# Patient Record
Sex: MALE | Race: WHITE | NOT HISPANIC OR LATINO | URBAN - METROPOLITAN AREA
[De-identification: names, ages, dates, MRNs, and addresses within clinical notes are randomized per-mention and may not be internally consistent; named-entity substitution may affect disease eponyms.]

---
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
## 2024-09-04 ENCOUNTER — APPOINTMENT (OUTPATIENT)
Dept: OTOLARYNGOLOGY | Facility: CLINIC | Age: 19
End: 2024-09-04
Payer: MEDICAID

## 2024-09-04 DIAGNOSIS — H93.8X3 OTHER SPECIFIED DISORDERS OF EAR, BILATERAL: ICD-10-CM

## 2024-09-04 DIAGNOSIS — R09.81 NASAL CONGESTION: ICD-10-CM

## 2024-09-04 DIAGNOSIS — H61.23 IMPACTED CERUMEN, BILATERAL: ICD-10-CM

## 2024-09-04 PROCEDURE — 69210 REMOVE IMPACTED EAR WAX UNI: CPT

## 2024-09-04 PROCEDURE — 99213 OFFICE O/P EST LOW 20 MIN: CPT | Mod: 25

## 2024-09-04 RX ORDER — FLUTICASONE PROPIONATE 50 UG/1
50 SPRAY, METERED NASAL
Qty: 2 | Refills: 2 | Status: ACTIVE | COMMUNITY
Start: 2024-09-04 | End: 1900-01-01

## 2024-09-04 NOTE — PHYSICAL EXAM
[Normal] : mucosa is normal [Midline] : trachea located in midline position [de-identified] : B/L TM's obstructed with cerumen. Wax removed with microsuction.

## 2024-09-04 NOTE — HISTORY OF PRESENT ILLNESS
[Clear Rhinorrhea] : clear rhinorrhea [Nasal Congestion] : nasal congestion [Ear Fullness] : ear fullness [FreeTextEntry1] : Patient is following up today for c/o clogged ears.  He currently had a sinus infection that started yesterday. He also was told by PCP his ears are clogged and he needs them flushed. Woke up today with stuffy nose. No mucus production. Not on any allergy medications or nasal sprays.  No further complaints [Facial Pain] : no facial pain [Headache] : no headache [Facial Pressure] : no facial pressure [Retro-Orbital Pain] : no retro-orbital pain [Purulent Rhinorrhea] : no purulent rhinorrhea [Dental Pain] : no dental pain [Postnasal Drainage] : no postnasal drainage [Ear Pain] : no ear pain [Neck Stiffness] : no neck stiffness [Chills] : no chills [Ear Pressure] : no ear pressure [Periorbital Redness] : no periorbital redness [Cough] : no cough [Fever] : no fever [Periorbital Swelling] : no periorbital swelling [Diplopia] : no diplopia [Nausea] : no nausea [Sore Throat] : no sore throat [Diabetes Mellitus] : no diabetes mellitus [Tobacco Use] : no tobacco use [Environmental Allergies] : no environmental allergies [Seasonal Allergies] : no seasonal allergies

## 2024-10-28 ENCOUNTER — APPOINTMENT (OUTPATIENT)
Dept: SPEECH THERAPY | Facility: CLINIC | Age: 19
End: 2024-10-28
